# Patient Record
Sex: FEMALE | Race: OTHER | HISPANIC OR LATINO | URBAN - METROPOLITAN AREA
[De-identification: names, ages, dates, MRNs, and addresses within clinical notes are randomized per-mention and may not be internally consistent; named-entity substitution may affect disease eponyms.]

---

## 2018-04-25 ENCOUNTER — EMERGENCY (EMERGENCY)
Facility: HOSPITAL | Age: 23
LOS: 1 days | Discharge: HOME | End: 2018-04-25
Attending: EMERGENCY MEDICINE | Admitting: EMERGENCY MEDICINE
Payer: COMMERCIAL

## 2018-04-25 VITALS
TEMPERATURE: 99 F | SYSTOLIC BLOOD PRESSURE: 137 MMHG | OXYGEN SATURATION: 99 % | HEART RATE: 89 BPM | RESPIRATION RATE: 16 BRPM | DIASTOLIC BLOOD PRESSURE: 76 MMHG

## 2018-04-25 PROCEDURE — 99283 EMERGENCY DEPT VISIT LOW MDM: CPT

## 2018-04-25 RX ORDER — PENICILLIN V POTASSIUM 250 MG
1 TABLET ORAL
Qty: 20 | Refills: 0 | OUTPATIENT
Start: 2018-04-25 | End: 2018-05-04

## 2018-04-25 RX ORDER — DEXAMETHASONE 0.5 MG/5ML
4 ELIXIR ORAL ONCE
Qty: 0 | Refills: 0 | Status: COMPLETED | OUTPATIENT
Start: 2018-04-25 | End: 2018-04-25

## 2018-04-25 RX ORDER — PENICILLIN V POTASSIUM 250 MG
500 TABLET ORAL ONCE
Qty: 0 | Refills: 0 | Status: COMPLETED | OUTPATIENT
Start: 2018-04-25 | End: 2018-04-25

## 2018-04-25 RX ORDER — IBUPROFEN 200 MG
600 TABLET ORAL ONCE
Qty: 0 | Refills: 0 | Status: COMPLETED | OUTPATIENT
Start: 2018-04-25 | End: 2018-04-25

## 2018-04-25 NOTE — ED PROVIDER NOTE - THROAT FINDINGS
no vesicles/THROAT RED/uvula midline/tonsillar edema and erythema, no exudates, uvula midline, + bilateral cervical LAD, R>L (right side is 3cm, not fluctuant)/NO VESICLES/ULCERS/no exudate

## 2018-04-25 NOTE — ED PROVIDER NOTE - MEDICAL DECISION MAKING DETAILS
[place]: 23F with pharyngitis and lymphadenitis.  No voice changes, trouble breathing, or trouble swallowing. [place]: 23F with pharyngitis and lymphadenitis.  No voice changes, trouble breathing, or trouble swallowing.  On exam, HDS, NAD, bilat cervical LAD R>L without fluctuance, oropharynx erythematous, tonsillar edema, no exudates, uvula midline.  Imp: pharyngitis and lymphadenitis.  No sign of deep space infection.  Will treat with dex and abx.

## 2018-04-25 NOTE — ED ADULT TRIAGE NOTE - CHIEF COMPLAINT QUOTE
Pt c/o throat pain with right-sided swelling behind ear.  Pt seen at Urgent Care and was told she was fine.

## 2018-04-25 NOTE — ED PROVIDER NOTE - OBJECTIVE STATEMENT
23F with PMH of asthma who comes in with several days of throat and LAD R>L.  No voice changes, no trouble swallowing, no stridor.  No F/C.

## 2018-04-26 RX ADMIN — Medication 600 MILLIGRAM(S): at 00:09

## 2018-04-26 RX ADMIN — Medication 500 MILLIGRAM(S): at 00:09

## 2018-04-26 RX ADMIN — Medication 4 MILLIGRAM(S): at 00:09

## 2018-04-26 NOTE — ED ADULT NURSE NOTE - OBJECTIVE STATEMENT
23y F AaOx3 c/o sore throat like symtpoms and pain around lymph nodes. pt has no voice changes/sob/BONE. pt states that these symptoms have been getting worse over the past 3 days. pt medicated as per md order.

## 2018-04-28 ENCOUNTER — EMERGENCY (EMERGENCY)
Facility: HOSPITAL | Age: 23
LOS: 1 days | Discharge: HOME | End: 2018-04-28
Attending: EMERGENCY MEDICINE | Admitting: EMERGENCY MEDICINE
Payer: COMMERCIAL

## 2018-04-28 VITALS
RESPIRATION RATE: 16 BRPM | TEMPERATURE: 98 F | HEART RATE: 96 BPM | SYSTOLIC BLOOD PRESSURE: 118 MMHG | OXYGEN SATURATION: 99 % | DIASTOLIC BLOOD PRESSURE: 77 MMHG

## 2018-04-28 VITALS
DIASTOLIC BLOOD PRESSURE: 83 MMHG | RESPIRATION RATE: 18 BRPM | HEART RATE: 88 BPM | TEMPERATURE: 100 F | SYSTOLIC BLOOD PRESSURE: 126 MMHG | OXYGEN SATURATION: 100 %

## 2018-04-28 LAB
ALBUMIN SERPL ELPH-MCNC: 4.3 G/DL — SIGNIFICANT CHANGE UP (ref 3.3–5)
ALP SERPL-CCNC: 55 U/L — SIGNIFICANT CHANGE UP (ref 40–120)
ALT FLD-CCNC: 25 U/L — SIGNIFICANT CHANGE UP (ref 4–33)
ANISOCYTOSIS BLD QL: SLIGHT — SIGNIFICANT CHANGE UP
AST SERPL-CCNC: 23 U/L — SIGNIFICANT CHANGE UP (ref 4–32)
BASOPHILS # BLD AUTO: 0.04 K/UL — SIGNIFICANT CHANGE UP (ref 0–0.2)
BASOPHILS NFR BLD AUTO: 0.5 % — SIGNIFICANT CHANGE UP (ref 0–2)
BASOPHILS NFR SPEC: 2.2 % — HIGH (ref 0–2)
BILIRUB SERPL-MCNC: 0.3 MG/DL — SIGNIFICANT CHANGE UP (ref 0.2–1.2)
BUN SERPL-MCNC: 11 MG/DL — SIGNIFICANT CHANGE UP (ref 7–23)
CALCIUM SERPL-MCNC: 9.1 MG/DL — SIGNIFICANT CHANGE UP (ref 8.4–10.5)
CHLORIDE SERPL-SCNC: 98 MMOL/L — SIGNIFICANT CHANGE UP (ref 98–107)
CO2 SERPL-SCNC: 27 MMOL/L — SIGNIFICANT CHANGE UP (ref 22–31)
CREAT SERPL-MCNC: 0.77 MG/DL — SIGNIFICANT CHANGE UP (ref 0.5–1.3)
EOSINOPHIL # BLD AUTO: 0.17 K/UL — SIGNIFICANT CHANGE UP (ref 0–0.5)
EOSINOPHIL NFR BLD AUTO: 2.2 % — SIGNIFICANT CHANGE UP (ref 0–6)
EOSINOPHIL NFR FLD: 2.2 % — SIGNIFICANT CHANGE UP (ref 0–6)
GIANT PLATELETS BLD QL SMEAR: PRESENT — SIGNIFICANT CHANGE UP
GLUCOSE SERPL-MCNC: 86 MG/DL — SIGNIFICANT CHANGE UP (ref 70–99)
HCG SERPL-ACNC: < 5 MIU/ML — SIGNIFICANT CHANGE UP
HCT VFR BLD CALC: 38.7 % — SIGNIFICANT CHANGE UP (ref 34.5–45)
HETEROPH AB TITR SER AGGL: NEGATIVE — SIGNIFICANT CHANGE UP
HGB BLD-MCNC: 12.2 G/DL — SIGNIFICANT CHANGE UP (ref 11.5–15.5)
IMM GRANULOCYTES # BLD AUTO: 0.03 # — SIGNIFICANT CHANGE UP
IMM GRANULOCYTES NFR BLD AUTO: 0.4 % — SIGNIFICANT CHANGE UP (ref 0–1.5)
LDH SERPL L TO P-CCNC: 196 U/L — SIGNIFICANT CHANGE UP (ref 135–225)
LYMPHOCYTES # BLD AUTO: 1.96 K/UL — SIGNIFICANT CHANGE UP (ref 1–3.3)
LYMPHOCYTES # BLD AUTO: 25.9 % — SIGNIFICANT CHANGE UP (ref 13–44)
LYMPHOCYTES NFR SPEC AUTO: 26.1 % — SIGNIFICANT CHANGE UP (ref 13–44)
MCHC RBC-ENTMCNC: 27.1 PG — SIGNIFICANT CHANGE UP (ref 27–34)
MCHC RBC-ENTMCNC: 31.5 % — LOW (ref 32–36)
MCV RBC AUTO: 85.8 FL — SIGNIFICANT CHANGE UP (ref 80–100)
MONOCYTES # BLD AUTO: 0.82 K/UL — SIGNIFICANT CHANGE UP (ref 0–0.9)
MONOCYTES NFR BLD AUTO: 10.8 % — SIGNIFICANT CHANGE UP (ref 2–14)
MONOCYTES NFR BLD: 7.6 % — SIGNIFICANT CHANGE UP (ref 2–9)
NEUTROPHIL AB SER-ACNC: 53.3 % — SIGNIFICANT CHANGE UP (ref 43–77)
NEUTROPHILS # BLD AUTO: 4.56 K/UL — SIGNIFICANT CHANGE UP (ref 1.8–7.4)
NEUTROPHILS NFR BLD AUTO: 60.2 % — SIGNIFICANT CHANGE UP (ref 43–77)
NEUTS BAND # BLD: 3.2 % — SIGNIFICANT CHANGE UP (ref 0–6)
NRBC # FLD: 0 — SIGNIFICANT CHANGE UP
PLATELET # BLD AUTO: 244 K/UL — SIGNIFICANT CHANGE UP (ref 150–400)
PLATELET COUNT - ESTIMATE: NORMAL — SIGNIFICANT CHANGE UP
PMV BLD: 9.3 FL — SIGNIFICANT CHANGE UP (ref 7–13)
POTASSIUM SERPL-MCNC: 3.4 MMOL/L — LOW (ref 3.5–5.3)
POTASSIUM SERPL-SCNC: 3.4 MMOL/L — LOW (ref 3.5–5.3)
PROT SERPL-MCNC: 7.9 G/DL — SIGNIFICANT CHANGE UP (ref 6–8.3)
RBC # BLD: 4.51 M/UL — SIGNIFICANT CHANGE UP (ref 3.8–5.2)
RBC # FLD: 12.9 % — SIGNIFICANT CHANGE UP (ref 10.3–14.5)
REVIEW TO FOLLOW: YES — SIGNIFICANT CHANGE UP
SMUDGE CELLS # BLD: PRESENT — SIGNIFICANT CHANGE UP
SODIUM SERPL-SCNC: 138 MMOL/L — SIGNIFICANT CHANGE UP (ref 135–145)
VARIANT LYMPHS # BLD: 5.4 % — SIGNIFICANT CHANGE UP
WBC # BLD: 7.58 K/UL — SIGNIFICANT CHANGE UP (ref 3.8–10.5)
WBC # FLD AUTO: 7.58 K/UL — SIGNIFICANT CHANGE UP (ref 3.8–10.5)

## 2018-04-28 PROCEDURE — 99284 EMERGENCY DEPT VISIT MOD MDM: CPT

## 2018-04-28 PROCEDURE — 70491 CT SOFT TISSUE NECK W/DYE: CPT | Mod: 26

## 2018-04-28 PROCEDURE — 71046 X-RAY EXAM CHEST 2 VIEWS: CPT | Mod: 26

## 2018-04-28 RX ORDER — SODIUM CHLORIDE 9 MG/ML
1000 INJECTION INTRAMUSCULAR; INTRAVENOUS; SUBCUTANEOUS ONCE
Qty: 0 | Refills: 0 | Status: COMPLETED | OUTPATIENT
Start: 2018-04-28 | End: 2018-04-28

## 2018-04-28 RX ORDER — ACETAMINOPHEN 500 MG
975 TABLET ORAL ONCE
Qty: 0 | Refills: 0 | Status: COMPLETED | OUTPATIENT
Start: 2018-04-28 | End: 2018-04-28

## 2018-04-28 RX ADMIN — Medication 975 MILLIGRAM(S): at 19:14

## 2018-04-28 RX ADMIN — SODIUM CHLORIDE 1000 MILLILITER(S): 9 INJECTION INTRAMUSCULAR; INTRAVENOUS; SUBCUTANEOUS at 15:53

## 2018-04-28 NOTE — ED PROVIDER NOTE - PROGRESS NOTE DETAILS
AK: Patient feeling mildly better. No trouble with breathing, no SOB. Will send out with augmenting and ENT f/u.

## 2018-04-28 NOTE — ED ADULT NURSE NOTE - OBJECTIVE STATEMENT
Alert and oriented x 4. Pt received to intake spot 5 due to swollen lymph nodes. Pt states : " I was in urgent care  3 days ago and was given penicillin. It didn't help for my swollen lymph nodes. I have pin to my neck too. Pt denies chest pain, shortness of breath, nausea, vomiting or dizziness. No painful urination or diarrhea.  IV 20g to right AC. Labs drawn . VSS. Will continue t monitor.

## 2018-04-28 NOTE — ED PROVIDER NOTE - ATTENDING CONTRIBUTION TO CARE
I performed a face-to-face evaluation of the patient and performed a history and physical examination. I agree with the history and physical examination.    Ameya: Young F, recent tooth 32 pulled for pain (not infection), on Amox, c/o spreading pain/redness down R face and neck. No stridor. Floor of mouth soft. Appears in discomfort. Concern for abscess or early Francisco's. Plan: ABx, CT, OMFS consult. I performed a face-to-face evaluation of the patient and performed a history and physical examination. I agree with the history and physical examination.    Seen in ED for neck LAD, got steroids, now pain w/ swallowing, more hoarseness, more swelling. Mild infectious Sx. Has erythema in back of throat. DDx: TB (scrofula), mono, HIV seroconversion, lymphoma, GC. Plan: throat Cx for strep and GC, HIV test, mono test, CT.

## 2018-04-28 NOTE — ED PROVIDER NOTE - MEDICAL DECISION MAKING DETAILS
Ameya: Young F, recent tooth 32 pulled for pain (not infection), on Amox, c/o spreading pain/redness down R face and neck. No stridor. Floor of mouth soft. Appears in discomfort. Concern for abscess or early Francisco's. Plan: ABx, CT, OMFS consult. 22yo female hx of asthma presenting with neck swelling, worsening since 4/25. Since then, worsening swelling in neck, more pain with swallowing, and voice changes. Mono vs HIV vs lymphoma vs reactive lymphadenopathy vs strep. Will get labs, LDH, monospot, HIV, GC/Chlamydia swab, strep swab, CT w/ IV contrast of neck.

## 2018-04-28 NOTE — ED PROVIDER NOTE - OBJECTIVE STATEMENT
22yo female hx of asthma presenting with neck swelling. Seen 4/25 in ED for cervical lymphadenopathy. Given dexamethasone and sent home with Penicillin. Since then, worsening swelling in neck, more pain with swallowing, and voice changes. Denies trouble breathing, CP, SOB< neck stiffness, fevers. Has been having intermittent chills for a week and had a cough a few days ago.

## 2018-04-28 NOTE — ED ADULT TRIAGE NOTE - CHIEF COMPLAINT QUOTE
pt seen here for "lymph node infection" on wed, told to come back if she experience any change in voice. reports bui in voice since last night, reports no improvement in swelling or pain with medication. respirations even and unlabored, no respiratory distress noted, no drooling, pmh: asthma

## 2018-04-28 NOTE — ED PROVIDER NOTE - PHYSICAL EXAMINATION
Gen: No acute distress, alert, cooperative  Head: Normocephalic, Atraumatic  HEENT: PERRL, oral mucosa mildly dry with erythema in posterior pharynx, normal conjunctiva. SHAHLA anterior cervical swelling. Feels very full, no discrete lymph nodes palpated. Non-tender with palpation.  Lung: CTAB, no respiratory distress, no crackles or wheezes, no stridor with airway or neck auscultation  CV: rrr, no murmur  Abd: soft, NTND, no rebound or guarding  MSK: No LE edema, no visible deformities  Neuro: No focal neurologic deficits  Skin: Warm and dry, no evidence of rash   Psych: normal affect, follows commands

## 2018-04-28 NOTE — ED ADULT TRIAGE NOTE - NS ED TRIAGE AVPU SCALE
LMOM for patient to call back and discuss. Alert-The patient is alert, awake and responds to voice. The patient is oriented to time, place, and person. The triage nurse is able to obtain subjective information.

## 2018-04-30 LAB — SPECIMEN SOURCE: SIGNIFICANT CHANGE UP

## 2018-05-02 LAB — N GONORRHOEA GENITAL QL CULT: SIGNIFICANT CHANGE UP

## 2018-09-17 ENCOUNTER — EMERGENCY (EMERGENCY)
Facility: HOSPITAL | Age: 23
LOS: 1 days | Discharge: HOME | End: 2018-09-17
Attending: EMERGENCY MEDICINE | Admitting: EMERGENCY MEDICINE
Payer: COMMERCIAL

## 2018-09-17 VITALS
OXYGEN SATURATION: 100 % | RESPIRATION RATE: 16 BRPM | SYSTOLIC BLOOD PRESSURE: 106 MMHG | HEART RATE: 71 BPM | DIASTOLIC BLOOD PRESSURE: 64 MMHG

## 2018-09-17 VITALS
DIASTOLIC BLOOD PRESSURE: 59 MMHG | RESPIRATION RATE: 18 BRPM | OXYGEN SATURATION: 100 % | SYSTOLIC BLOOD PRESSURE: 114 MMHG | TEMPERATURE: 99 F | HEART RATE: 69 BPM

## 2018-09-17 PROBLEM — J45.909 UNSPECIFIED ASTHMA, UNCOMPLICATED: Chronic | Status: ACTIVE | Noted: 2018-04-25

## 2018-09-17 PROCEDURE — 73610 X-RAY EXAM OF ANKLE: CPT | Mod: 26,RT

## 2018-09-17 PROCEDURE — 99283 EMERGENCY DEPT VISIT LOW MDM: CPT

## 2018-09-17 PROCEDURE — 73630 X-RAY EXAM OF FOOT: CPT | Mod: 26,RT

## 2018-09-17 RX ORDER — IBUPROFEN 200 MG
600 TABLET ORAL ONCE
Qty: 0 | Refills: 0 | Status: COMPLETED | OUTPATIENT
Start: 2018-09-17 | End: 2018-09-17

## 2018-09-17 RX ORDER — ACETAMINOPHEN 500 MG
650 TABLET ORAL ONCE
Qty: 0 | Refills: 0 | Status: COMPLETED | OUTPATIENT
Start: 2018-09-17 | End: 2018-09-17

## 2018-09-17 RX ADMIN — Medication 600 MILLIGRAM(S): at 12:22

## 2018-09-17 RX ADMIN — Medication 650 MILLIGRAM(S): at 12:22

## 2018-09-17 NOTE — ED PROVIDER NOTE - OBJECTIVE STATEMENT
right ankle/foot pain s/p twisted it in a ditch yesterday- no head injury, loc, headache.  Able to walk but wanted to get checked.    Came to er today because toes were hurting.  No weakness, numbness,  No redness, swelling.

## 2018-09-17 NOTE — ED PROVIDER NOTE - LOCATION
ankle, foot and right knee/ankle lateral foot, ankle and anterior knee (abrasions) knee/ankle/foot/right knee with superficial abrasions, FROM, , right ankle mild swelling, and right lateral foot mild swelling, ttp, no deformity

## 2018-09-17 NOTE — ED PROVIDER NOTE - MEDICAL DECISION MAKING DETAILS
Pt with right ankle/foot pain after twisted it in a ditch, small abrasions to the knee- Able to walk on it, likely sprain- xrays, pain medications, and reassess

## 2018-09-17 NOTE — ED PROVIDER NOTE - SKIN WOUND TYPE
BRUSING/ABRASION(S)/abrasions to right knee, slight swelling, bruising, right lateral foot below ankle

## 2019-07-13 NOTE — ED ADULT NURSE NOTE - CAS EDN DISCHARGE INTERVENTIONS
Steri-strip placed to L 1st/2nd digits. L 3rd digit nailbed injury with adaptic and kerlex dressing
IV discontinued, cath removed intact

## 2020-03-20 NOTE — ED ADULT TRIAGE NOTE - HEART RATE (BEATS/MIN)
71 BP - 95/62, requesting diluadid 8mg for pain 9/10. Pt states this is good blood pressure for her. `Pt received 4mg dilaudid at 1715.